# Patient Record
Sex: FEMALE | URBAN - METROPOLITAN AREA
[De-identification: names, ages, dates, MRNs, and addresses within clinical notes are randomized per-mention and may not be internally consistent; named-entity substitution may affect disease eponyms.]

---

## 2021-06-29 ENCOUNTER — OFFICE VISIT (OUTPATIENT)
Dept: URBAN - METROPOLITAN AREA CLINIC 33 | Facility: CLINIC | Age: 66
End: 2021-06-29
Payer: COMMERCIAL

## 2021-06-29 DIAGNOSIS — E11.3293 TYPE 2 DIAB W MILD NONPRLF DIABETIC RTNOP W/O MACULAR EDEMA, BILATERAL: Primary | ICD-10-CM

## 2021-06-29 DIAGNOSIS — H43.813 VITREOUS DEGENERATION, BILATERAL: ICD-10-CM

## 2021-06-29 DIAGNOSIS — H35.373 PUCKERING OF MACULA, BILATERAL: ICD-10-CM

## 2021-06-29 DIAGNOSIS — H40.013 OPEN ANGLE WITH BORDERLINE FINDINGS, LOW RISK, BILATERAL: ICD-10-CM

## 2021-06-29 PROCEDURE — 99204 OFFICE O/P NEW MOD 45 MIN: CPT | Performed by: OPTOMETRIST

## 2021-06-29 PROCEDURE — 92133 CPTRZD OPH DX IMG PST SGM ON: CPT | Performed by: OPTOMETRIST

## 2021-06-29 ASSESSMENT — INTRAOCULAR PRESSURE
OD: 21
OS: 21

## 2021-06-29 NOTE — IMPRESSION/PLAN
Impression: Puckering of macula, bilateral: H35.373. Plan: Educated patient about today's findings. Macular pucker does not appear to be visually significant at this time and may be monitored with dilated eye exam. Discussed the etiology of the condition with the patient and the possibility for progression of visual distortion and blur.

## 2021-06-29 NOTE — IMPRESSION/PLAN
Impression: Type 2 diab w mild nonprlf diabetic rtnop w/o macular edema, bilateral: N18.6069. Plan: Diabetes type II: mild nonproliferative diabetic retinopathy, no signs of neovascularization noted. No treatment necessary at this time. Patient was instructed to monitor vision for sudden changes and to call if visual changes noted. Discussed ocular and systemic benefits of blood sugar control. Letter sent to communicate findings to PCP.

## 2021-06-29 NOTE — IMPRESSION/PLAN
Impression: Vitreous degeneration, bilateral: H43.813. Plan: Posterior vitreous detachment accounts for the patient's complaints. There is no evidence of retinal pathology.

## 2021-06-29 NOTE — IMPRESSION/PLAN
Impression: Open angle with borderline findings, low risk, bilateral: H40.013. Plan: IOPs 21/21 without medication. POAG suspect due to asymmetric cups OS>OD and mild IOP elevation. ONH-OCT ordered and reviewed shows mild RNLF thinning OS. Patient also reports glaucoma being monitored and previous VF was 2 years ago.

## 2021-08-30 ENCOUNTER — OFFICE VISIT (OUTPATIENT)
Dept: URBAN - METROPOLITAN AREA CLINIC 33 | Facility: CLINIC | Age: 66
End: 2021-08-30
Payer: COMMERCIAL

## 2021-08-30 PROCEDURE — 99213 OFFICE O/P EST LOW 20 MIN: CPT | Performed by: OPTOMETRIST

## 2021-08-30 PROCEDURE — 76514 ECHO EXAM OF EYE THICKNESS: CPT | Performed by: OPTOMETRIST

## 2021-08-30 PROCEDURE — 92083 EXTENDED VISUAL FIELD XM: CPT | Performed by: OPTOMETRIST

## 2021-08-30 RX ORDER — BIMATOPROST 0.1 MG/ML
0.01 % SOLUTION/ DROPS OPHTHALMIC
Qty: 2.5 | Refills: 8 | Status: ACTIVE
Start: 2021-08-30

## 2021-08-30 ASSESSMENT — INTRAOCULAR PRESSURE
OS: 21
OD: 21

## 2021-08-30 NOTE — IMPRESSION/PLAN
Impression: Primary open-angle glaucoma, left eye, mild stage: H40.1121. Plan: IOP 21/21 with thick pachs and no medication. (+)FHx, Glaucoma. HVF 24-2 shows early inferior defect OD and mod. inferior arcuate w/ nasal step OS that coorelates with RNFL thinning on OCT OU. 

Start Lumigan QHS OU (issued sample x 2)

## 2021-10-11 ENCOUNTER — OFFICE VISIT (OUTPATIENT)
Dept: URBAN - METROPOLITAN AREA CLINIC 33 | Facility: CLINIC | Age: 66
End: 2021-10-11
Payer: COMMERCIAL

## 2021-10-11 DIAGNOSIS — H40.1121 PRIMARY OPEN-ANGLE GLAUCOMA, LEFT EYE, MILD STAGE: Primary | ICD-10-CM

## 2021-10-11 PROCEDURE — 99213 OFFICE O/P EST LOW 20 MIN: CPT | Performed by: OPTOMETRIST

## 2021-10-11 ASSESSMENT — INTRAOCULAR PRESSURE
OD: 16
OS: 16

## 2021-10-11 NOTE — IMPRESSION/PLAN
Impression: Primary open-angle glaucoma, left eye, mild stage: H40.1121. Plan: IOP 16/16 with thick pachs and Lumigan QHS OU. Pressures responding well to medication. Recommend patient continues the same medication regimen.